# Patient Record
Sex: MALE | Race: WHITE | NOT HISPANIC OR LATINO
[De-identification: names, ages, dates, MRNs, and addresses within clinical notes are randomized per-mention and may not be internally consistent; named-entity substitution may affect disease eponyms.]

---

## 2021-08-10 ENCOUNTER — TRANSCRIPTION ENCOUNTER (OUTPATIENT)
Age: 66
End: 2021-08-10

## 2022-05-24 ENCOUNTER — APPOINTMENT (OUTPATIENT)
Dept: ORTHOPEDIC SURGERY | Facility: CLINIC | Age: 67
End: 2022-05-24
Payer: MEDICARE

## 2022-05-24 VITALS — HEIGHT: 67 IN | WEIGHT: 154 LBS | BODY MASS INDEX: 24.17 KG/M2

## 2022-05-24 PROCEDURE — 99213 OFFICE O/P EST LOW 20 MIN: CPT

## 2022-05-24 NOTE — ASSESSMENT
[FreeTextEntry1] : S/P RT GOLDIE 7/20/16, S/P LT GOLDIE 9/30/2009 - DOING WELL. ABX AND PRECAUTIONS REVIEWED. QUESTIONS ANSWERED.

## 2022-05-24 NOTE — IMAGING
[de-identified] : Constitutional: The patient appears well developed, well nourished. Examination of patients ability to communicate\par functionally was normal. \par \par Skin: Skin of the head and face is normal without rashes, lesions or ulcers. Skin of the left lower extremities is normal\par without rashes, lesions, or ulcers. Skin of the right lower extremity is normal without rashes, lesions or ulcers. \par \par Neurologic: Alert and oriented to time, place and person. No evidence of mood disorder, calm affect. \par \par Right Hip/Thigh: Range of motion of the hip is as follows: Patient displays good endpoint with internal rotation at 15\par degrees Strength testing of the hip/thigh is as follows: Hip flexion strength is 5/5, Hip extension strength is 5/5, Hip\par abductionstrength is 5/5 and Hip adductionstrength is 5/5. Neurological testing of the hip/thigh is as follows: motor\par exam 5/5 throughout, light touch intact throughout and no focal motor deficits. \par X-Ray Examination of the HIP with Pelvis 2-3 views AP and lateral view. shows components well-fixed, in\par good position. \par \par Left Hip/Thigh: Range of motion of the hip is as follows: Patient displays good endpoint with internal rotation at 15\par degrees Strength testing of the hip/thigh is as follows: Hip flexion strength is 5/5, Hip extension strength is 5/5, Hip\par abductionstrength is 5/5 and Hip adductionstrength is 5/5. Neurological testing of the hip/thigh is as follows: motor exam 5/5 throughout, light touch intact throughout and no focal motor deficits.

## 2024-05-09 ENCOUNTER — APPOINTMENT (OUTPATIENT)
Dept: ORTHOPEDIC SURGERY | Facility: CLINIC | Age: 69
End: 2024-05-09
Payer: MEDICARE

## 2024-05-09 VITALS
DIASTOLIC BLOOD PRESSURE: 74 MMHG | SYSTOLIC BLOOD PRESSURE: 155 MMHG | WEIGHT: 158 LBS | BODY MASS INDEX: 24.8 KG/M2 | HEIGHT: 67 IN | TEMPERATURE: 97.1 F | HEART RATE: 59 BPM

## 2024-05-09 DIAGNOSIS — M47.816 SPONDYLOSIS W/OUT MYELOPATHY OR RADICULOPATHY, LUMBAR REGION: ICD-10-CM

## 2024-05-09 DIAGNOSIS — M54.16 RADICULOPATHY, LUMBAR REGION: ICD-10-CM

## 2024-05-09 PROCEDURE — 99204 OFFICE O/P NEW MOD 45 MIN: CPT

## 2024-05-09 RX ORDER — OXCARBAZEPINE 150 MG/1
150 TABLET, FILM COATED ORAL
Refills: 0 | Status: ACTIVE | COMMUNITY

## 2024-05-09 RX ORDER — BACLOFEN 15 MG/1
TABLET ORAL
Refills: 0 | Status: ACTIVE | COMMUNITY

## 2024-05-09 RX ORDER — ATORVASTATIN CALCIUM 80 MG/1
TABLET, FILM COATED ORAL
Refills: 0 | Status: ACTIVE | COMMUNITY

## 2024-05-09 RX ORDER — LISINOPRIL 10 MG/1
10 TABLET ORAL
Refills: 0 | Status: ACTIVE | COMMUNITY

## 2024-05-09 RX ORDER — NORTRIPTYLINE HYDROCHLORIDE 25 MG/1
25 CAPSULE ORAL
Refills: 0 | Status: ACTIVE | COMMUNITY

## 2024-05-09 RX ORDER — ALFUZOSIN HYDROCHLORIDE 10 MG/1
10 TABLET, EXTENDED RELEASE ORAL
Refills: 0 | Status: ACTIVE | COMMUNITY

## 2024-05-09 RX ORDER — ASPIRIN 81 MG
81 TABLET, DELAYED RELEASE (ENTERIC COATED) ORAL
Refills: 0 | Status: ACTIVE | COMMUNITY

## 2024-05-09 RX ORDER — DEXAMETHASONE 0.5 MG/5ML
0.5 ELIXIR ORAL
Refills: 0 | Status: ACTIVE | COMMUNITY

## 2024-05-09 RX ORDER — OXYCODONE HYDROCHLORIDE 30 MG/1
TABLET ORAL
Refills: 0 | Status: ACTIVE | COMMUNITY

## 2024-05-09 RX ORDER — METFORMIN HYDROCHLORIDE 500 MG/1
500 TABLET, COATED ORAL
Refills: 0 | Status: ACTIVE | COMMUNITY

## 2024-05-09 RX ORDER — DIAZEPAM 5 MG/1
TABLET ORAL
Refills: 0 | Status: ACTIVE | COMMUNITY

## 2024-05-10 PROBLEM — M47.816 LUMBAR SPONDYLOSIS: Status: ACTIVE | Noted: 2024-05-10

## 2024-05-10 PROBLEM — M54.16 LUMBAR RADICULOPATHY, ACUTE: Status: ACTIVE | Noted: 2024-05-10

## 2024-05-10 NOTE — ASSESSMENT
[FreeTextEntry1] : 68-year-old male with left L3 and L4 radiculopathy   Today altered I reviewed his physical exam findings and signs symptoms as well as MRI today in the office.  He has a considerable amount of low back and more specifically left leg pain radiating into his lateral gluteal region down the anterior and medial thigh and below the thigh along the medial tibia to the ankle and L3 and L4 distribution.  His MRI demonstrates severe foraminal stenosis of the exiting L3 nerve root as well as broad-based herniation causing moderate central but severe impingement of the bilateral L4 nerve roots left greater than right and I believe that his symptoms are combination of the L3 and L4 radiculopathy.  He has stability symptoms now for 1 month he has been undergoing physical therapy taking pain medications he is unable to take NSAIDs given his history of stroke and is on Eliquis is currently on oral narcotics gabapentin and a prednisone taper and still having a considerable amount of pain mainly with walking he has about 80% leg pain 20% back pain.  I discussed with him treatment options for lumbar radiculopathy and that I agree with his current conservative management and I agree with the recommendation of an epidural steroid injection as this can help with some of his symptoms and we will see how he responds.

## 2024-05-10 NOTE — HISTORY OF PRESENT ILLNESS
[de-identified] : Chief Complaint: Left leg pain  History of Present Illness: 68-year-old male presenting today for continued evaluation for his left leg pain.  He states that about 5 weeks ago he developed insidious onset of severe left leg pain radiating from his left gluteal region left anterior thigh medial thigh also extending down to the medial tibia in an L3 and L4 distribution he states that he will get shooting pain radiating into the anterior thigh and then subsequently a different shooting pain shooting down his leg to his medial tibia.  He states that the pain was so severe he was seen in the emergency room at [facility] [place] about 5 weeks ago further workup with his primary care doctor he underwent an MRI on 5 1 he has been going to physical therapy for about 3 weeks now and has noticed some improvement in his symptoms.  He has limited back pain symptoms are purely all leg pain he rates the pain a 7 out of 10 in severity is significant worse when he tries to walk.  He has some altered sensation along his lower leg along his medial shin.  He states that he is having difficulty walking and his left leg can buckle at times because of the pain and he feels that it may be weak.  He recently saw a pain management doctor perform some trigger point injections which provide some relief in his left gluteal pain he is planned to undergo a epidural steroid injection in 1 week.   Past medical history, past surgical history, medications, allergies, social history, and family history are as documented in our records today.  Notable items include: Atrial fibrillation on Eliquis   Review of Systems: I have reviewed the patient's documented Review of Systems data today, I concur with this documentation.

## 2024-05-10 NOTE — DISCUSSION/SUMMARY
[de-identified] : He will continue taking his Tylenol as well as his oxycodone and gabapentin prescribed by his primary care doctor He is also on a prednisone taper and he states that he had some improvement in his symptoms albeit minimal He is scheduled to undergo a epidural steroid injection by pain management on 5/16/2024 I will see him back 1 week after to see how he is doing and discuss further management at that time.

## 2024-05-10 NOTE — PHYSICAL EXAM
[de-identified] : CONSTITUTIONAL: Patient is a very pleasant individual who is well-nourished and appears stated age. PSYCHIATRIC: Alert and oriented times three and in no apparent distress, and participates with orthopedic evaluation well. HEAD: Atraumatic and nonsyndromic in appearance. EENT: No thyromegaly, EOMI. RESPIRATORY: Respiratory rate is regular, not dyspneic on examination. LYMPHATICS: There is no cervical or axillary lymphadenopathy. INTEGUMENTARY: Skin is clean, dry, and intact to bilateral lower extremities. VASCULAR: There is brisk capillary refill about the bilateral Lower Extremities with 2+ DP Pulse  Palpation: There is left gluteal tenderness left anterior thigh tenderness Muscle Strength Testing: Hip Flexion: 5/5 B/L Knee Extension: 5/5 B/L Knee Flexion: 5/5 B/L Dorsiflexion: 5/5 B/L EHL: 5/5 B/L Plantarflexion: 5/5 B/L  Sensation: SILT L2-S1 B/L except: Altered sensation to medial tibia medial malleolus  Reflexes: Decreased left quadriceps reflex  Gait: Antalgic gait secondary to prior stroke and left leg pain walks with a stiff and straight left leg  Special Testing:  Negative SLR BLLE Negative clonus BLLE  [de-identified] : MRI lumbar spine performed in [facility] radiology on 5/1/2024 axial sagittal T1 and T2 and STIR weighted images demonstrates multilevel lumbar spondylosis there is autofusion severe disc height loss of L4 and L5 indicative of prior laminectomy and osteomyelitis discitis residual L3-4 demonstrates broad-based disc bulge with superimposed disc protrusion causing significant bilateral lateral recess stenosis and severe left foraminal stenosis

## 2024-05-14 ENCOUNTER — APPOINTMENT (OUTPATIENT)
Dept: ORTHOPEDIC SURGERY | Facility: CLINIC | Age: 69
End: 2024-05-14
Payer: MEDICARE

## 2024-05-14 VITALS — HEIGHT: 68 IN | WEIGHT: 158 LBS | BODY MASS INDEX: 23.95 KG/M2

## 2024-05-14 DIAGNOSIS — Z96.642 PRESENCE OF LEFT ARTIFICIAL HIP JOINT: ICD-10-CM

## 2024-05-14 DIAGNOSIS — Z96.641 PRESENCE OF RIGHT ARTIFICIAL HIP JOINT: ICD-10-CM

## 2024-05-14 DIAGNOSIS — M54.16 RADICULOPATHY, LUMBAR REGION: ICD-10-CM

## 2024-05-14 PROCEDURE — 73502 X-RAY EXAM HIP UNI 2-3 VIEWS: CPT

## 2024-05-14 PROCEDURE — 99213 OFFICE O/P EST LOW 20 MIN: CPT

## 2024-05-14 NOTE — ASSESSMENT
[FreeTextEntry1] : S/P RT GOLDIE 7/20/16, S/P LT GOLDIE 9/30/2009 - DOING WELL.  68 year M WITH MODERATE BILATERAL HIP PAIN. PAIN IS TO THE LATERAL ASPECT OF THE HIP AND RADIATES DOWN THE LLE TO LEFT SHIN. H/O LUMBAR ISSUES, HAS HAD LESI IN THE PAST WITH GOOD RELIEDF. PAIN WORSENS WITH PROLONGED STANDING AND SITTING TO STAND. PAIN IS AFFECTING FUNCTIONAL ACTIVITIES. THERE IS NO REPRODUCIBLE GROIN PAIN ON PHYSICAL EXAM. XRAYS REVIEWED WITH COMPONENTS WELL FIXED. LUMBAR OA. TREATMENT OPTIONS REVIEWED. WILL CONTINUE WITH SPINE FOR LUMBAR ISSUES. QUESTIONS ANSWERED.

## 2024-05-14 NOTE — HISTORY OF PRESENT ILLNESS
[10] : 10 [0] : 0 [Constant] : constant [de-identified] : 5.24.24 PATIENT IS HERE FOR [facility] HIP PAIN. HE IS HERE FOR HIS 2 YEAR FOLLOW UP. HE HAS HAD SEVERE PAIN DOWN LEFT LEG SINCE 4/17/24 AFTER A WORKOUT. PAIN WORSE WITH STANDING, SITTING LONG PERIODS, RADAITES TO LEFT SHIN SEEN BY SPINE SURGEON AND SCHEDULED FOR LESI ALVAREZ. VALIUM Q.   PMHX [FreeTextEntry1] : hips

## 2024-05-14 NOTE — IMAGING
[de-identified] : Constitutional: The patient appears well developed, well nourished. Examination of patients ability to communicate\par  functionally was normal. \par  \par  Skin: Skin of the head and face is normal without rashes, lesions or ulcers. Skin of the left lower extremities is normal\par  without rashes, lesions, or ulcers. Skin of the right lower extremity is normal without rashes, lesions or ulcers. \par  \par  Neurologic: Alert and oriented to time, place and person. No evidence of mood disorder, calm affect. \par  \par  Right Hip/Thigh: Range of motion of the hip is as follows: Patient displays good endpoint with internal rotation at 15\par  degrees Strength testing of the hip/thigh is as follows: Hip flexion strength is 5/5, Hip extension strength is 5/5, Hip\par  abductionstrength is 5/5 and Hip adductionstrength is 5/5. Neurological testing of the hip/thigh is as follows: motor\par  exam 5/5 throughout, light touch intact throughout and no focal motor deficits. \par  X-Ray Examination of the HIP with Pelvis 2-3 views AP and lateral view. shows components well-fixed, in\par  good position. \par  \par  Left Hip/Thigh: Range of motion of the hip is as follows: Patient displays good endpoint with internal rotation at 15\par  degrees Strength testing of the hip/thigh is as follows: Hip flexion strength is 5/5, Hip extension strength is 5/5, Hip\par  abductionstrength is 5/5 and Hip adductionstrength is 5/5. Neurological testing of the hip/thigh is as follows: motor exam 5/5 throughout, light touch intact throughout and no focal motor deficits.

## 2024-06-12 ENCOUNTER — APPOINTMENT (OUTPATIENT)
Dept: ORTHOPEDIC SURGERY | Facility: CLINIC | Age: 69
End: 2024-06-12

## 2024-12-26 ENCOUNTER — OFFICE (OUTPATIENT)
Dept: URBAN - METROPOLITAN AREA CLINIC 63 | Facility: CLINIC | Age: 69
Setting detail: OPHTHALMOLOGY
End: 2024-12-26
Payer: MEDICARE

## 2024-12-26 DIAGNOSIS — H35.371: ICD-10-CM

## 2024-12-26 DIAGNOSIS — E11.3293: ICD-10-CM

## 2024-12-26 PROCEDURE — 92134 CPTRZ OPH DX IMG PST SGM RTA: CPT | Performed by: OPHTHALMOLOGY

## 2024-12-26 PROCEDURE — 92235 FLUORESCEIN ANGRPH MLTIFRAME: CPT | Performed by: OPHTHALMOLOGY

## 2024-12-26 PROCEDURE — 92004 COMPRE OPH EXAM NEW PT 1/>: CPT | Performed by: OPHTHALMOLOGY

## 2024-12-26 ASSESSMENT — VISUAL ACUITY
OS_BCVA: 20/50-1
OD_BCVA: 20/50-1

## 2024-12-26 ASSESSMENT — TONOMETRY
OS_IOP_MMHG: 15
OD_IOP_MMHG: 15

## 2024-12-26 ASSESSMENT — CONFRONTATIONAL VISUAL FIELD TEST (CVF)
OS_FINDINGS: FULL
OD_FINDINGS: FULL

## 2025-06-26 ENCOUNTER — OFFICE (OUTPATIENT)
Dept: URBAN - METROPOLITAN AREA CLINIC 63 | Facility: CLINIC | Age: 70
Setting detail: OPHTHALMOLOGY
End: 2025-06-26
Payer: MEDICARE

## 2025-06-26 DIAGNOSIS — E11.3293: ICD-10-CM

## 2025-06-26 DIAGNOSIS — H35.371: ICD-10-CM

## 2025-06-26 PROCEDURE — 92235 FLUORESCEIN ANGRPH MLTIFRAME: CPT | Performed by: OPHTHALMOLOGY

## 2025-06-26 PROCEDURE — 92134 CPTRZ OPH DX IMG PST SGM RTA: CPT | Performed by: OPHTHALMOLOGY

## 2025-06-26 PROCEDURE — 92014 COMPRE OPH EXAM EST PT 1/>: CPT | Performed by: OPHTHALMOLOGY

## 2025-06-26 ASSESSMENT — CONFRONTATIONAL VISUAL FIELD TEST (CVF)
OD_FINDINGS: FULL
OS_FINDINGS: FULL

## 2025-06-26 ASSESSMENT — TONOMETRY
OD_IOP_MMHG: 15
OS_IOP_MMHG: 14

## 2025-06-26 ASSESSMENT — VISUAL ACUITY
OS_BCVA: 20/50-1
OD_BCVA: 20/50-1